# Patient Record
Sex: FEMALE | Race: BLACK OR AFRICAN AMERICAN | NOT HISPANIC OR LATINO | URBAN - METROPOLITAN AREA
[De-identification: names, ages, dates, MRNs, and addresses within clinical notes are randomized per-mention and may not be internally consistent; named-entity substitution may affect disease eponyms.]

---

## 2021-11-13 ENCOUNTER — HOSPITAL ENCOUNTER (EMERGENCY)
Facility: HOSPITAL | Age: 26
Discharge: HOME | End: 2021-11-13
Attending: EMERGENCY MEDICINE | Admitting: EMERGENCY MEDICINE

## 2021-11-13 ENCOUNTER — APPOINTMENT (OUTPATIENT)
Dept: ULTRASOUND IMAGING | Facility: HOSPITAL | Age: 26
End: 2021-11-13

## 2021-11-13 VITALS
HEART RATE: 91 BPM | SYSTOLIC BLOOD PRESSURE: 131 MMHG | TEMPERATURE: 98.2 F | OXYGEN SATURATION: 100 % | RESPIRATION RATE: 18 BRPM | DIASTOLIC BLOOD PRESSURE: 73 MMHG

## 2021-11-13 DIAGNOSIS — O21.9 VOMITING DURING PREGNANCY: ICD-10-CM

## 2021-11-13 DIAGNOSIS — O20.0 THREATENED MISCARRIAGE: Primary | ICD-10-CM

## 2021-11-13 LAB
ABO GROUP BLD: NORMAL
ALBUMIN SERPL-MCNC: 4.5 G/DL (ref 3.5–5.2)
ALBUMIN/GLOB SERPL: 1.7 G/DL
ALP SERPL-CCNC: 46 U/L (ref 39–117)
ALT SERPL W P-5'-P-CCNC: 7 U/L (ref 1–33)
ANION GAP SERPL CALCULATED.3IONS-SCNC: 11.4 MMOL/L (ref 5–15)
AST SERPL-CCNC: 9 U/L (ref 1–32)
BASOPHILS # BLD AUTO: 0.06 10*3/MM3 (ref 0–0.2)
BASOPHILS NFR BLD AUTO: 1.1 % (ref 0–1.5)
BILIRUB SERPL-MCNC: 0.6 MG/DL (ref 0–1.2)
BLD GP AB SCN SERPL QL: NEGATIVE
BUN SERPL-MCNC: 6 MG/DL (ref 6–20)
BUN/CREAT SERPL: 11.1 (ref 7–25)
CALCIUM SPEC-SCNC: 9.6 MG/DL (ref 8.6–10.5)
CHLORIDE SERPL-SCNC: 102 MMOL/L (ref 98–107)
CO2 SERPL-SCNC: 25.6 MMOL/L (ref 22–29)
CREAT SERPL-MCNC: 0.54 MG/DL (ref 0.57–1)
DEPRECATED RDW RBC AUTO: 41.8 FL (ref 37–54)
EOSINOPHIL # BLD AUTO: 0.05 10*3/MM3 (ref 0–0.4)
EOSINOPHIL NFR BLD AUTO: 0.9 % (ref 0.3–6.2)
ERYTHROCYTE [DISTWIDTH] IN BLOOD BY AUTOMATED COUNT: 12.4 % (ref 12.3–15.4)
GFR SERPL CREATININE-BSD FRML MDRD: >150 ML/MIN/1.73
GLOBULIN UR ELPH-MCNC: 2.6 GM/DL
GLUCOSE SERPL-MCNC: 94 MG/DL (ref 65–99)
HCG INTACT+B SERPL-ACNC: NORMAL MIU/ML
HCT VFR BLD AUTO: 41.3 % (ref 34–46.6)
HGB BLD-MCNC: 13.8 G/DL (ref 12–15.9)
IMM GRANULOCYTES # BLD AUTO: 0.02 10*3/MM3 (ref 0–0.05)
IMM GRANULOCYTES NFR BLD AUTO: 0.4 % (ref 0–0.5)
LYMPHOCYTES # BLD AUTO: 1.69 10*3/MM3 (ref 0.7–3.1)
LYMPHOCYTES NFR BLD AUTO: 30.2 % (ref 19.6–45.3)
MCH RBC QN AUTO: 30.7 PG (ref 26.6–33)
MCHC RBC AUTO-ENTMCNC: 33.4 G/DL (ref 31.5–35.7)
MCV RBC AUTO: 92 FL (ref 79–97)
MONOCYTES # BLD AUTO: 0.53 10*3/MM3 (ref 0.1–0.9)
MONOCYTES NFR BLD AUTO: 9.5 % (ref 5–12)
NEUTROPHILS NFR BLD AUTO: 3.25 10*3/MM3 (ref 1.7–7)
NEUTROPHILS NFR BLD AUTO: 57.9 % (ref 42.7–76)
NRBC BLD AUTO-RTO: 0 /100 WBC (ref 0–0.2)
PLATELET # BLD AUTO: 261 10*3/MM3 (ref 140–450)
PMV BLD AUTO: 9.7 FL (ref 6–12)
POTASSIUM SERPL-SCNC: 3.8 MMOL/L (ref 3.5–5.2)
PROT SERPL-MCNC: 7.1 G/DL (ref 6–8.5)
RBC # BLD AUTO: 4.49 10*6/MM3 (ref 3.77–5.28)
RH BLD: POSITIVE
SODIUM SERPL-SCNC: 139 MMOL/L (ref 136–145)
T&S EXPIRATION DATE: NORMAL
WBC # BLD AUTO: 5.6 10*3/MM3 (ref 3.4–10.8)

## 2021-11-13 PROCEDURE — 84702 CHORIONIC GONADOTROPIN TEST: CPT | Performed by: EMERGENCY MEDICINE

## 2021-11-13 PROCEDURE — 99284 EMERGENCY DEPT VISIT MOD MDM: CPT

## 2021-11-13 PROCEDURE — 76817 TRANSVAGINAL US OBSTETRIC: CPT

## 2021-11-13 PROCEDURE — 76815 OB US LIMITED FETUS(S): CPT

## 2021-11-13 PROCEDURE — 80053 COMPREHEN METABOLIC PANEL: CPT | Performed by: EMERGENCY MEDICINE

## 2021-11-13 PROCEDURE — 93976 VASCULAR STUDY: CPT

## 2021-11-13 PROCEDURE — 85025 COMPLETE CBC W/AUTO DIFF WBC: CPT | Performed by: EMERGENCY MEDICINE

## 2021-11-13 PROCEDURE — 86900 BLOOD TYPING SEROLOGIC ABO: CPT | Performed by: EMERGENCY MEDICINE

## 2021-11-13 PROCEDURE — 86850 RBC ANTIBODY SCREEN: CPT | Performed by: EMERGENCY MEDICINE

## 2021-11-13 PROCEDURE — 86901 BLOOD TYPING SEROLOGIC RH(D): CPT | Performed by: EMERGENCY MEDICINE

## 2021-11-13 RX ORDER — ACETAMINOPHEN 500 MG
1000 TABLET ORAL ONCE
Status: COMPLETED | OUTPATIENT
Start: 2021-11-13 | End: 2021-11-13

## 2021-11-13 RX ORDER — DOXYLAMINE SUCCINATE AND PYRIDOXINE HYDROCHLORIDE, DELAYED RELEASE TABLETS 10 MG/10 MG 10; 10 MG/1; MG/1
2 TABLET, DELAYED RELEASE ORAL 2 TIMES DAILY PRN
Qty: 60 TABLET | Refills: 0 | Status: SHIPPED | OUTPATIENT
Start: 2021-11-13 | End: 2021-11-13 | Stop reason: SDUPTHER

## 2021-11-13 RX ORDER — DOXYLAMINE SUCCINATE AND PYRIDOXINE HYDROCHLORIDE, DELAYED RELEASE TABLETS 10 MG/10 MG 10; 10 MG/1; MG/1
2 TABLET, DELAYED RELEASE ORAL 2 TIMES DAILY PRN
Qty: 60 TABLET | Refills: 0 | Status: SHIPPED | OUTPATIENT
Start: 2021-11-13 | End: 2021-12-13

## 2021-11-13 RX ORDER — SODIUM CHLORIDE 0.9 % (FLUSH) 0.9 %
10 SYRINGE (ML) INJECTION AS NEEDED
Status: DISCONTINUED | OUTPATIENT
Start: 2021-11-13 | End: 2021-11-13 | Stop reason: HOSPADM

## 2021-11-13 RX ADMIN — SODIUM CHLORIDE, POTASSIUM CHLORIDE, SODIUM LACTATE AND CALCIUM CHLORIDE 1000 ML: 600; 310; 30; 20 INJECTION, SOLUTION INTRAVENOUS at 09:03

## 2021-11-13 RX ADMIN — ACETAMINOPHEN 1000 MG: 500 TABLET, FILM COATED ORAL at 09:12

## 2021-11-13 NOTE — DISCHARGE INSTRUCTIONS
Follow-up with your OB doctor once you are back in town. Call on Monday to schedule an appointment.

## 2021-11-13 NOTE — ED PROVIDER NOTES
EMERGENCY DEPARTMENT ENCOUNTER    Room Number:  13/13  Date of encounter:  11/13/2021  PCP: Provider, No Known  Historian: Patient      HPI:  Chief Complaint: Abdominal pain  A complete HPI/ROS/PMH/PSH/SH/FH are unobtainable due to: None    Context: Aliyah Somers is a 26 y.o. female who presents to the ED c/o abdominal pain.  His lower abdominal pain that began a few minutes prior to arrival today.  States that yesterday she was having vomiting and noted some vaginal spotting.  She is approximately 6 weeks pregnant.  G2, P0.  This morning she noted some tissue passing from her vagina.  No fever.      PAST MEDICAL HISTORY  Active Ambulatory Problems     Diagnosis Date Noted   • No Active Ambulatory Problems     Resolved Ambulatory Problems     Diagnosis Date Noted   • No Resolved Ambulatory Problems     No Additional Past Medical History         PAST SURGICAL HISTORY  No past surgical history on file.      FAMILY HISTORY  No family history on file.      SOCIAL HISTORY  Social History     Socioeconomic History   • Marital status: Single         ALLERGIES  Patient has no known allergies.        REVIEW OF SYSTEMS  Review of Systems     All systems reviewed and negative except for those discussed in HPI.       PHYSICAL EXAM    I have reviewed the triage vital signs and nursing notes.    ED Triage Vitals [11/13/21 0846]   Temp Heart Rate Resp BP SpO2   98.2 °F (36.8 °C) 83 -- 117/66 100 %      Temp src Heart Rate Source Patient Position BP Location FiO2 (%)   -- -- -- -- --       Physical Exam  GENERAL: not distressed  HENT: nares patent  EYES: no scleral icterus  CV: regular rhythm, regular rate  RESPIRATORY: normal effort, clear to auscultation bilaterally  ABDOMEN: soft, obese abdomen, minimal lower abdominal tenderness bilaterally  MUSCULOSKELETAL: no deformity  NEURO: alert, moves all extremities, follows commands  SKIN: warm, dry        LAB RESULTS  Recent Results (from the past 24 hour(s))   Comprehensive  Metabolic Panel    Collection Time: 11/13/21  9:08 AM    Specimen: Blood   Result Value Ref Range    Glucose 94 65 - 99 mg/dL    BUN 6 6 - 20 mg/dL    Creatinine 0.54 (L) 0.57 - 1.00 mg/dL    Sodium 139 136 - 145 mmol/L    Potassium 3.8 3.5 - 5.2 mmol/L    Chloride 102 98 - 107 mmol/L    CO2 25.6 22.0 - 29.0 mmol/L    Calcium 9.6 8.6 - 10.5 mg/dL    Total Protein 7.1 6.0 - 8.5 g/dL    Albumin 4.50 3.50 - 5.20 g/dL    ALT (SGPT) 7 1 - 33 U/L    AST (SGOT) 9 1 - 32 U/L    Alkaline Phosphatase 46 39 - 117 U/L    Total Bilirubin 0.6 0.0 - 1.2 mg/dL    eGFR  African Amer >150 >60 mL/min/1.73    Globulin 2.6 gm/dL    A/G Ratio 1.7 g/dL    BUN/Creatinine Ratio 11.1 7.0 - 25.0    Anion Gap 11.4 5.0 - 15.0 mmol/L   hCG, Quantitative, Pregnancy    Collection Time: 11/13/21  9:08 AM    Specimen: Blood   Result Value Ref Range    HCG Quantitative 50,272.00 mIU/mL   Type & Screen    Collection Time: 11/13/21  9:08 AM    Specimen: Blood   Result Value Ref Range    ABO Type A     RH type Positive     Antibody Screen Negative     T&S Expiration Date 11/16/2021 11:59:59 PM    CBC Auto Differential    Collection Time: 11/13/21  9:08 AM    Specimen: Blood   Result Value Ref Range    WBC 5.60 3.40 - 10.80 10*3/mm3    RBC 4.49 3.77 - 5.28 10*6/mm3    Hemoglobin 13.8 12.0 - 15.9 g/dL    Hematocrit 41.3 34.0 - 46.6 %    MCV 92.0 79.0 - 97.0 fL    MCH 30.7 26.6 - 33.0 pg    MCHC 33.4 31.5 - 35.7 g/dL    RDW 12.4 12.3 - 15.4 %    RDW-SD 41.8 37.0 - 54.0 fl    MPV 9.7 6.0 - 12.0 fL    Platelets 261 140 - 450 10*3/mm3    Neutrophil % 57.9 42.7 - 76.0 %    Lymphocyte % 30.2 19.6 - 45.3 %    Monocyte % 9.5 5.0 - 12.0 %    Eosinophil % 0.9 0.3 - 6.2 %    Basophil % 1.1 0.0 - 1.5 %    Immature Grans % 0.4 0.0 - 0.5 %    Neutrophils, Absolute 3.25 1.70 - 7.00 10*3/mm3    Lymphocytes, Absolute 1.69 0.70 - 3.10 10*3/mm3    Monocytes, Absolute 0.53 0.10 - 0.90 10*3/mm3    Eosinophils, Absolute 0.05 0.00 - 0.40 10*3/mm3    Basophils, Absolute 0.06  0.00 - 0.20 10*3/mm3    Immature Grans, Absolute 0.02 0.00 - 0.05 10*3/mm3    nRBC 0.0 0.0 - 0.2 /100 WBC       Ordered the above labs and independently reviewed the results.        RADIOLOGY  US Ob Limited 1 + Fetuses    Result Date: 11/13/2021  LIMITED OB ULTRASOUND INCLUDING TRANSVAGINAL IMAGING AND DOPPLER VASCULAR EVALUATION  HISTORY: Early pregnancy. Vaginal spotting.  TECHNIQUE: The examination was performed as an emergency procedure.  FINDINGS: The ultrasound evaluation demonstrates a single intrauterine gestational sac with a well-defined fetal pole and the crown-rump measurement of 5.4 mm corresponds to a gestational age of 6 weeks and 3 days. There is fetal cardiac activity present with a heart rate of 108 bpm. No intrauterine abnormality is seen on the current exam. There is no complex adnexal mass or free fluid.  CONCLUSION: Single intrauterine pregnancy with gestational age of approximately 6 weeks and 3 days. Continued close clinical correlation is recommended with follow-up ultrasound as needed.        I ordered the above noted radiological studies. Reviewed by me and discussed with radiologist.  See dictation for official radiology interpretation.      PROCEDURES    Procedures      MEDICATIONS GIVEN IN ER    Medications   sodium chloride 0.9 % flush 10 mL (has no administration in time range)   lactated ringers bolus 1,000 mL (1,000 mL Intravenous New Bag 11/13/21 0903)   acetaminophen (TYLENOL) tablet 1,000 mg (1,000 mg Oral Given 11/13/21 0912)         PROGRESS, DATA ANALYSIS, CONSULTS, AND MEDICAL DECISION MAKING    All labs have been independently reviewed by me.  All radiology studies have been reviewed by me and discussed with radiologist dictating the report.   EKG's independently viewed and interpreted by me.  Discussion below represents my analysis of pertinent findings related to patient's condition, differential diagnosis, treatment plan and final disposition.    Differential diagnosis  includes but not limited to:  - hepatobiliary pathology such as cholecystitis, cholangitis, and symptomatic cholelithiasis  - Pancreatitis  - Dyspepsia  - Small bowel obstruction  - Appendicitis  - Diverticulitis  - UTI including pyelonephritis  - Ureteral stone  - Zoster  - Colitis, including infectious and ischemic  - TOA  - ovarian torsion  - PID or cervicitis  - ovarian cyst, including hemorrhagic  - ectopic pregnancy  - pregnancy  - endometriosis  - fibroids    ED Course as of 11/13/21 1152   Sat Nov 13, 2021   0943 WBC: 5.60 [TD]   0943 Hemoglobin: 13.8 [TD]   0943 Creatinine(!): 0.54 [TD]   0943 Sodium: 139 [TD]   0943 Potassium: 3.8 [TD]   1011 HCG Quantitative: 50,272.00 [TD]   1048 RH type: Positive [TD]      ED Course User Index  [TD] Som Bae II, MD       On repeat evaluation of the patient, symptoms are well controlled.    Ultrasound imaging is acutely negative. She has intrauterine pregnancy with fetal heart tones.    Patient will follow up with her OB back in [place]. She will be returning tomorrow.    PPE: The patient wore a surgical mask throughout the entire patient encounter. I wore an N95.    AS OF 11:52 EST VITALS:    BP - 119/73  HR - 83  TEMP - 98.2 °F (36.8 °C)  O2 SATS - 100%        DIAGNOSIS  Final diagnoses:   Threatened miscarriage   Vomiting during pregnancy         DISPOSITION  DISCHARGE    FOLLOW-UP  [facility]  [address] [facility] [id number] [phone]  Go to   If symptoms worsen         Medication List      New Prescriptions    doxylamine-pyridoxine 10-10 MG tablet delayed-release EC tablet  Commonly known as: Diclegis  Take 2 tablets by mouth 2 (Two) Times a Day As Needed (nausea) for up to 30 days.           Where to Get Your Medications      These medications were sent to [facility]  [address] [place], [place] [id number]    Hours: 7:00 AM-6:00 PM Mon-Fri, 8:00 AM-4:30 PM Sat-Sun (Closed  12-12:30PM) Phone: [phone]   · doxylamine-pyridoxine 10-10 MG tablet delayed-release EC tablet                  Som Bae II, MD  11/13/21 9020

## 2021-11-13 NOTE — ED TRIAGE NOTES
c/o abd pain, vomiting since last night.  Reports 6 wks pregnant with spotting. Pt wearing mask on arrival